# Patient Record
Sex: FEMALE | Race: BLACK OR AFRICAN AMERICAN | Employment: PART TIME | URBAN - METROPOLITAN AREA
[De-identification: names, ages, dates, MRNs, and addresses within clinical notes are randomized per-mention and may not be internally consistent; named-entity substitution may affect disease eponyms.]

---

## 2017-05-12 ENCOUNTER — HOSPITAL ENCOUNTER (OUTPATIENT)
Dept: GENERAL RADIOLOGY | Age: 22
Discharge: HOME | End: 2017-05-12
Payer: COMMERCIAL

## 2017-05-12 ENCOUNTER — HOSPITAL ENCOUNTER (OUTPATIENT)
Dept: LAB | Age: 22
Discharge: HOME | End: 2017-05-12
Payer: COMMERCIAL

## 2017-05-12 DIAGNOSIS — M25.512 LEFT SHOULDER PAIN: ICD-10-CM

## 2017-05-12 DIAGNOSIS — M54.2 CERVICALGIA: ICD-10-CM

## 2017-05-12 DIAGNOSIS — M54.50 LOW BACK PAIN: ICD-10-CM

## 2017-05-12 LAB
BASOPHILS # BLD AUTO: 0 K/UL (ref 0–0.06)
BASOPHILS # BLD: 0 % (ref 0–2)
DIFFERENTIAL METHOD BLD: ABNORMAL
EOSINOPHIL # BLD: 0.1 K/UL (ref 0–0.4)
EOSINOPHIL NFR BLD: 1 % (ref 0–5)
ERYTHROCYTE [DISTWIDTH] IN BLOOD BY AUTOMATED COUNT: 14.6 % (ref 11.6–14.5)
FERRITIN SERPL-MCNC: 29 NG/ML (ref 8–388)
HCT VFR BLD AUTO: 35.5 % (ref 35–45)
HGB BLD-MCNC: 11.3 G/DL (ref 12–16)
IRON SATN MFR SERPL: 26 %
IRON SERPL-MCNC: 90 UG/DL (ref 50–175)
LYMPHOCYTES # BLD AUTO: 35 % (ref 21–52)
LYMPHOCYTES # BLD: 2.6 K/UL (ref 0.9–3.6)
MCH RBC QN AUTO: 21.9 PG (ref 24–34)
MCHC RBC AUTO-ENTMCNC: 31.8 G/DL (ref 31–37)
MCV RBC AUTO: 68.9 FL (ref 74–97)
MONOCYTES # BLD: 0.6 K/UL (ref 0.05–1.2)
MONOCYTES NFR BLD AUTO: 8 % (ref 3–10)
NEUTS SEG # BLD: 4 K/UL (ref 1.8–8)
NEUTS SEG NFR BLD AUTO: 56 % (ref 40–73)
PLATELET # BLD AUTO: 305 K/UL (ref 135–420)
PMV BLD AUTO: 11.5 FL (ref 9.2–11.8)
RBC # BLD AUTO: 5.15 M/UL (ref 4.2–5.3)
TIBC SERPL-MCNC: 349 UG/DL (ref 250–450)
WBC # BLD AUTO: 7.2 K/UL (ref 4.6–13.2)

## 2017-05-12 PROCEDURE — 73030 X-RAY EXAM OF SHOULDER: CPT

## 2017-05-12 PROCEDURE — 36415 COLL VENOUS BLD VENIPUNCTURE: CPT | Performed by: INTERNAL MEDICINE

## 2017-05-12 PROCEDURE — 82728 ASSAY OF FERRITIN: CPT | Performed by: INTERNAL MEDICINE

## 2017-05-12 PROCEDURE — 72100 X-RAY EXAM L-S SPINE 2/3 VWS: CPT

## 2017-05-12 PROCEDURE — 83540 ASSAY OF IRON: CPT | Performed by: INTERNAL MEDICINE

## 2017-05-12 PROCEDURE — 72040 X-RAY EXAM NECK SPINE 2-3 VW: CPT

## 2017-05-12 PROCEDURE — 85025 COMPLETE CBC W/AUTO DIFF WBC: CPT | Performed by: INTERNAL MEDICINE

## 2023-10-03 SDOH — HEALTH STABILITY: PHYSICAL HEALTH: ON AVERAGE, HOW MANY DAYS PER WEEK DO YOU ENGAGE IN MODERATE TO STRENUOUS EXERCISE (LIKE A BRISK WALK)?: 0 DAYS

## 2023-10-03 ASSESSMENT — SOCIAL DETERMINANTS OF HEALTH (SDOH)
WITHIN THE LAST YEAR, HAVE YOU BEEN KICKED, HIT, SLAPPED, OR OTHERWISE PHYSICALLY HURT BY YOUR PARTNER OR EX-PARTNER?: NO
WITHIN THE LAST YEAR, HAVE YOU BEEN AFRAID OF YOUR PARTNER OR EX-PARTNER?: NO
WITHIN THE LAST YEAR, HAVE TO BEEN RAPED OR FORCED TO HAVE ANY KIND OF SEXUAL ACTIVITY BY YOUR PARTNER OR EX-PARTNER?: NO
WITHIN THE LAST YEAR, HAVE YOU BEEN HUMILIATED OR EMOTIONALLY ABUSED IN OTHER WAYS BY YOUR PARTNER OR EX-PARTNER?: YES

## 2023-10-04 ASSESSMENT — ENCOUNTER SYMPTOMS: SHORTNESS OF BREATH: 0

## 2023-10-06 ENCOUNTER — OFFICE VISIT (OUTPATIENT)
Facility: CLINIC | Age: 28
End: 2023-10-06
Payer: COMMERCIAL

## 2023-10-06 VITALS
DIASTOLIC BLOOD PRESSURE: 80 MMHG | WEIGHT: 268 LBS | HEIGHT: 71 IN | RESPIRATION RATE: 17 BRPM | HEART RATE: 90 BPM | OXYGEN SATURATION: 100 % | SYSTOLIC BLOOD PRESSURE: 124 MMHG | BODY MASS INDEX: 37.52 KG/M2 | TEMPERATURE: 97.8 F

## 2023-10-06 DIAGNOSIS — K59.9 BOWEL DYSFUNCTION: Primary | ICD-10-CM

## 2023-10-06 DIAGNOSIS — Z11.59 NEED FOR HEPATITIS C SCREENING TEST: ICD-10-CM

## 2023-10-06 DIAGNOSIS — Z76.89 ENCOUNTER TO ESTABLISH CARE: ICD-10-CM

## 2023-10-06 DIAGNOSIS — F33.1 MODERATE EPISODE OF RECURRENT MAJOR DEPRESSIVE DISORDER (HCC): ICD-10-CM

## 2023-10-06 DIAGNOSIS — F41.1 GAD (GENERALIZED ANXIETY DISORDER): ICD-10-CM

## 2023-10-06 DIAGNOSIS — Z11.4 SCREENING FOR HIV (HUMAN IMMUNODEFICIENCY VIRUS): ICD-10-CM

## 2023-10-06 DIAGNOSIS — Z13.220 SCREENING FOR LIPID DISORDERS: ICD-10-CM

## 2023-10-06 PROBLEM — K59.00 CONSTIPATION: Status: ACTIVE | Noted: 2023-10-06

## 2023-10-06 PROBLEM — G40.909 EPILEPTIC SEIZURE (HCC): Status: ACTIVE | Noted: 2023-10-06

## 2023-10-06 PROCEDURE — 99204 OFFICE O/P NEW MOD 45 MIN: CPT

## 2023-10-06 SDOH — ECONOMIC STABILITY: INCOME INSECURITY: HOW HARD IS IT FOR YOU TO PAY FOR THE VERY BASICS LIKE FOOD, HOUSING, MEDICAL CARE, AND HEATING?: VERY HARD

## 2023-10-06 SDOH — ECONOMIC STABILITY: HOUSING INSECURITY
IN THE LAST 12 MONTHS, WAS THERE A TIME WHEN YOU DID NOT HAVE A STEADY PLACE TO SLEEP OR SLEPT IN A SHELTER (INCLUDING NOW)?: NO

## 2023-10-06 SDOH — ECONOMIC STABILITY: FOOD INSECURITY: WITHIN THE PAST 12 MONTHS, THE FOOD YOU BOUGHT JUST DIDN'T LAST AND YOU DIDN'T HAVE MONEY TO GET MORE.: OFTEN TRUE

## 2023-10-06 SDOH — ECONOMIC STABILITY: FOOD INSECURITY: WITHIN THE PAST 12 MONTHS, YOU WORRIED THAT YOUR FOOD WOULD RUN OUT BEFORE YOU GOT MONEY TO BUY MORE.: OFTEN TRUE

## 2023-10-06 ASSESSMENT — PATIENT HEALTH QUESTIONNAIRE - PHQ9
10. IF YOU CHECKED OFF ANY PROBLEMS, HOW DIFFICULT HAVE THESE PROBLEMS MADE IT FOR YOU TO DO YOUR WORK, TAKE CARE OF THINGS AT HOME, OR GET ALONG WITH OTHER PEOPLE: 0
1. LITTLE INTEREST OR PLEASURE IN DOING THINGS: 1
7. TROUBLE CONCENTRATING ON THINGS, SUCH AS READING THE NEWSPAPER OR WATCHING TELEVISION: 1
3. TROUBLE FALLING OR STAYING ASLEEP: 2
SUM OF ALL RESPONSES TO PHQ QUESTIONS 1-9: 10
SUM OF ALL RESPONSES TO PHQ9 QUESTIONS 1 & 2: 2
SUM OF ALL RESPONSES TO PHQ QUESTIONS 1-9: 10
5. POOR APPETITE OR OVEREATING: 1
4. FEELING TIRED OR HAVING LITTLE ENERGY: 2
6. FEELING BAD ABOUT YOURSELF - OR THAT YOU ARE A FAILURE OR HAVE LET YOURSELF OR YOUR FAMILY DOWN: 2
2. FEELING DOWN, DEPRESSED OR HOPELESS: 1
8. MOVING OR SPEAKING SO SLOWLY THAT OTHER PEOPLE COULD HAVE NOTICED. OR THE OPPOSITE, BEING SO FIGETY OR RESTLESS THAT YOU HAVE BEEN MOVING AROUND A LOT MORE THAN USUAL: 0
SUM OF ALL RESPONSES TO PHQ QUESTIONS 1-9: 10
9. THOUGHTS THAT YOU WOULD BE BETTER OFF DEAD, OR OF HURTING YOURSELF: 0
SUM OF ALL RESPONSES TO PHQ QUESTIONS 1-9: 10

## 2023-10-06 ASSESSMENT — ANXIETY QUESTIONNAIRES
GAD7 TOTAL SCORE: 13
2. NOT BEING ABLE TO STOP OR CONTROL WORRYING: 3
1. FEELING NERVOUS, ANXIOUS, OR ON EDGE: 2
3. WORRYING TOO MUCH ABOUT DIFFERENT THINGS: 3
7. FEELING AFRAID AS IF SOMETHING AWFUL MIGHT HAPPEN: 0
4. TROUBLE RELAXING: 2
IF YOU CHECKED OFF ANY PROBLEMS ON THIS QUESTIONNAIRE, HOW DIFFICULT HAVE THESE PROBLEMS MADE IT FOR YOU TO DO YOUR WORK, TAKE CARE OF THINGS AT HOME, OR GET ALONG WITH OTHER PEOPLE: NOT DIFFICULT AT ALL
5. BEING SO RESTLESS THAT IT IS HARD TO SIT STILL: 2
6. BECOMING EASILY ANNOYED OR IRRITABLE: 1

## 2023-10-06 ASSESSMENT — ENCOUNTER SYMPTOMS
DIARRHEA: 1
CONSTIPATION: 1
ABDOMINAL PAIN: 1

## 2023-10-06 NOTE — PATIENT INSTRUCTIONS
Dr Inés Edward [address] [place] [address] [facility] [place] [address]  Phone [phone]  Fax [phone]

## 2023-10-06 NOTE — PROGRESS NOTES
Ashlyn Last presents today for   Chief Complaint   Patient presents with    [facility]    Knee Pain     Pt c/o hearing \"crackles and pops\" in knees. C/o having pain bilaterally, pain is consistent, pain is dull. States it feels tender. Onset 4 years. No swelling. No aggravating factors and relieved with massaging. No treatment. Anemia     States she is always cold and would like her ironed checked. Diarrhea     Pt states she having bm every 2 days. Pt c/o having admominal pain 1x every week. Onset located in lower pelvic area. Pain is sharp and lasts until pt uses restroom, when the pain come bm is watery. No aggravating factors. Only relieved with bm. Abdominal Pain       Is someone accompanying this pt? no    Is the patient using any DME equipment during [address]? no    Depression Screening:      10/6/2023    11:49 AM   PHQ-9 Questionaire   Little interest or pleasure in doing things 1   Feeling down, depressed, or hopeless 1   Trouble falling or staying asleep, or sleeping too much 2   Feeling tired or having little energy 2   Poor appetite or overeating 1   Feeling bad about yourself - or that you are a failure or have let yourself or your family down 2   Trouble concentrating on things, such as reading the newspaper or watching television 1   Moving or speaking so slowly that other people could have noticed.  Or the opposite - being so fidgety or restless that you have been moving around a lot more than usual 0   Thoughts that you would be better off dead, or of hurting yourself in some way 0   PHQ-9 Total Score 10   If you checked off any problems, how difficult have these problems made it for you to do your work, take care of things at home, or get along with other people? 0        TESHA 7-Anxiety       10/6/2023    11:50 AM   TESHA-7 SCREENING   Feeling nervous, anxious, or on edge More than half the days   Not being able to stop or control worrying Nearly every day   Worrying too much about
intermittently  Characteristics: bowel movement every 2 days, sometimes has extreme pain, sharp pains to abdomen, has always had pains with using the bathroom, overheating, intermittent diarrhea/constipation  Associated symptoms: abdominal pain, lasts until she uses the restroom  No blood in stool or urine  Aggravating factors: trying new foods, gets sick, and states it's not all the time, when she ate chery for the first time, she got sick, but was fine the second time, when she tried a new steak restaurant, when tries different foods, fast foods  Relieving factors: avoids new foods  Treatment: attempted to try vitamins, but forgets  Has issues with constipation as a child, unsure if she was evaluated by specialist    Anxiety  Patient is seen for anxiety  Associated symptoms:  see TESHA-7  Current treatment: none  Followed by psychiatry: none  Mother has extreme anxiety      10/6/2023    11:50 AM   TESHA-7 SCREENING   Feeling nervous, anxious, or on edge More than half the days   Not being able to stop or control worrying Nearly every day   Worrying too much about different things Nearly every day   Trouble relaxing More than half the days   Being so restless that it is hard to sit still More than half the days   Becoming easily annoyed or irritable Several days   Feeling afraid as if something awful might happen Not at all   TESHA-7 Total Score 13   How difficult have these problems made it for you to do your work, take care of things at home, or get along with other people? Not difficult at all     Depression  Patient is seen for followup of depression.    Ongoing symptoms include:  see PHQ-9  Current treatment: none  Followed by psychiatry: none  Plan for homicide or suicide: None      10/6/2023    11:49 AM   PHQ-9    Little interest or pleasure in doing things 1   Feeling down, depressed, or hopeless 1   Trouble falling or staying asleep, or sleeping too much 2   Feeling tired or having little energy 2   Poor appetite

## 2023-10-20 ENCOUNTER — HOSPITAL ENCOUNTER (OUTPATIENT)
Facility: HOSPITAL | Age: 28
End: 2023-10-20
Payer: COMMERCIAL

## 2023-10-20 DIAGNOSIS — K59.9 BOWEL DYSFUNCTION: ICD-10-CM

## 2023-10-20 DIAGNOSIS — F33.1 MODERATE EPISODE OF RECURRENT MAJOR DEPRESSIVE DISORDER (HCC): ICD-10-CM

## 2023-10-20 DIAGNOSIS — Z11.59 NEED FOR HEPATITIS C SCREENING TEST: ICD-10-CM

## 2023-10-20 DIAGNOSIS — Z11.4 SCREENING FOR HIV (HUMAN IMMUNODEFICIENCY VIRUS): ICD-10-CM

## 2023-10-20 DIAGNOSIS — Z13.220 SCREENING FOR LIPID DISORDERS: ICD-10-CM

## 2023-10-20 DIAGNOSIS — F41.1 GAD (GENERALIZED ANXIETY DISORDER): ICD-10-CM

## 2023-10-20 LAB
ALBUMIN SERPL-MCNC: 3.9 G/DL (ref 3.4–5)
ALBUMIN/GLOB SERPL: 1 (ref 0.8–1.7)
ALP SERPL-CCNC: 87 U/L (ref 45–117)
ALT SERPL-CCNC: 21 U/L (ref 13–56)
ANION GAP SERPL CALC-SCNC: 4 MMOL/L (ref 3–18)
AST SERPL-CCNC: 13 U/L (ref 10–38)
BASOPHILS # BLD: 0.1 K/UL (ref 0–0.1)
BASOPHILS NFR BLD: 1 % (ref 0–2)
BILIRUB SERPL-MCNC: 0.8 MG/DL (ref 0.2–1)
BUN SERPL-MCNC: 10 MG/DL (ref 7–18)
BUN/CREAT SERPL: 13 (ref 12–20)
CALCIUM SERPL-MCNC: 9.1 MG/DL (ref 8.5–10.1)
CHLORIDE SERPL-SCNC: 107 MMOL/L (ref 100–111)
CHOLEST SERPL-MCNC: 159 MG/DL
CO2 SERPL-SCNC: 27 MMOL/L (ref 21–32)
CREAT SERPL-MCNC: 0.76 MG/DL (ref 0.6–1.3)
DIFFERENTIAL METHOD BLD: ABNORMAL
EOSINOPHIL # BLD: 0.1 K/UL (ref 0–0.4)
EOSINOPHIL NFR BLD: 2 % (ref 0–5)
ERYTHROCYTE [DISTWIDTH] IN BLOOD BY AUTOMATED COUNT: 15.3 % (ref 11.6–14.5)
GLOBULIN SER CALC-MCNC: 3.8 G/DL (ref 2–4)
GLUCOSE SERPL-MCNC: 86 MG/DL (ref 74–99)
HCT VFR BLD AUTO: 41.5 % (ref 35–45)
HCV AB SER IA-ACNC: 0.08 INDEX
HCV AB SERPL QL IA: NEGATIVE
HDLC SERPL-MCNC: 58 MG/DL (ref 40–60)
HDLC SERPL: 2.7 (ref 0–5)
HEPATITIS C COMMENT: NORMAL
HGB BLD-MCNC: 12.4 G/DL (ref 12–16)
HIV 1+2 AB+HIV1 P24 AG SERPL QL IA: NONREACTIVE
HIV 1/2 RESULT COMMENT: NORMAL
IMM GRANULOCYTES # BLD AUTO: 0 K/UL (ref 0–0.04)
IMM GRANULOCYTES NFR BLD AUTO: 0 % (ref 0–0.5)
LDLC SERPL CALC-MCNC: 87.4 MG/DL (ref 0–100)
LIPID PANEL: NORMAL
LYMPHOCYTES # BLD: 2.3 K/UL (ref 0.9–3.6)
LYMPHOCYTES NFR BLD: 44 % (ref 21–52)
MCH RBC QN AUTO: 21.6 PG (ref 24–34)
MCHC RBC AUTO-ENTMCNC: 29.9 G/DL (ref 31–37)
MCV RBC AUTO: 72.4 FL (ref 78–100)
MONOCYTES # BLD: 0.3 K/UL (ref 0.05–1.2)
MONOCYTES NFR BLD: 7 % (ref 3–10)
NEUTS SEG # BLD: 2.4 K/UL (ref 1.8–8)
NEUTS SEG NFR BLD: 46 % (ref 40–73)
NRBC # BLD: 0 K/UL (ref 0–0.01)
NRBC BLD-RTO: 0 PER 100 WBC
PLATELET # BLD AUTO: 326 K/UL (ref 135–420)
PMV BLD AUTO: 11.5 FL (ref 9.2–11.8)
POTASSIUM SERPL-SCNC: 4.1 MMOL/L (ref 3.5–5.5)
PROT SERPL-MCNC: 7.7 G/DL (ref 6.4–8.2)
RBC # BLD AUTO: 5.73 M/UL (ref 4.2–5.3)
SODIUM SERPL-SCNC: 138 MMOL/L (ref 136–145)
TRIGL SERPL-MCNC: 68 MG/DL
TSH SERPL DL<=0.05 MIU/L-ACNC: 1.57 UIU/ML (ref 0.36–3.74)
VLDLC SERPL CALC-MCNC: 13.6 MG/DL
WBC # BLD AUTO: 5.3 K/UL (ref 4.6–13.2)

## 2023-10-20 PROCEDURE — 87389 HIV-1 AG W/HIV-1&-2 AB AG IA: CPT

## 2023-10-20 PROCEDURE — 80061 LIPID PANEL: CPT

## 2023-10-20 PROCEDURE — 86803 HEPATITIS C AB TEST: CPT

## 2023-10-20 PROCEDURE — 36415 COLL VENOUS BLD VENIPUNCTURE: CPT

## 2023-10-20 PROCEDURE — 80053 COMPREHEN METABOLIC PANEL: CPT

## 2023-10-20 PROCEDURE — 84443 ASSAY THYROID STIM HORMONE: CPT

## 2023-10-20 PROCEDURE — 85025 COMPLETE CBC W/AUTO DIFF WBC: CPT

## 2023-11-09 PROBLEM — Z00.00 ANNUAL PHYSICAL EXAM: Status: ACTIVE | Noted: 2023-11-09

## 2023-11-09 PROBLEM — Z00.00 ANNUAL PHYSICAL EXAM: Status: RESOLVED | Noted: 2023-11-09 | Resolved: 2023-11-09

## 2023-11-09 PROBLEM — E61.1 LOW IRON: Status: ACTIVE | Noted: 2023-11-09

## 2023-11-21 PROBLEM — Z00.00 ANNUAL PHYSICAL EXAM: Status: RESOLVED | Noted: 2023-11-09 | Resolved: 2023-11-21

## 2023-11-28 PROBLEM — Z00.00 ANNUAL PHYSICAL EXAM: Status: RESOLVED | Noted: 2023-11-09 | Resolved: 2023-11-28

## 2023-11-28 ASSESSMENT — ENCOUNTER SYMPTOMS
SHORTNESS OF BREATH: 0
VOMITING: 0
CHEST TIGHTNESS: 0
COUGH: 0
ABDOMINAL PAIN: 0
DIARRHEA: 0
CONSTIPATION: 0
NAUSEA: 0
BLOOD IN STOOL: 0

## 2023-11-28 NOTE — PROGRESS NOTES
Anahi Randall is a 29 y.o. female is seen on 12/1/2023 for Annual Exam, Low iron, Anxiety, Depression, and Discuss Labs (10/20/23)    Assessment & Plan:     1. Annual physical exam  Assessment & Plan:  Physical activity for a total of 150 minutes per week is recommended, drink plenty of water. Reduce CHO intake, such as white pastas, white rice, white breads. Avoid fried foods, and eat more green, leafy vegetables, whole grains, and lean proteins. Discussed recommended screenings and vaccines. 2. Low iron  Assessment & Plan:  History of anemia  Advised pt to increase PO intake of foods high in iron  Recheck CBC in 3 months  If no improvement, consider referral to hematology for management   Orders:  -     CBC with Auto Differential; Future  3. TESHA (generalized anxiety disorder)  Assessment & Plan:  Pt prefers to start counseling/therapy  Recommend management per psychiatry  Pt given contact information for psychiatry offices, and advised to call offices for management  4. Moderate episode of recurrent major depressive disorder [facility])  Assessment & Plan:  Pt prefers to start counseling/therapy  Recommend management per psychiatry  Pt given contact information for psychiatry offices, and advised to call offices for management  5. Encounter to discuss test results  6. Influenza vaccination declined     Follow-up and Dispositions    Return in about 3 months (around 3/1/2024) for Anemia, Anxiety, Depression, Lab results.        Subjective:     HPI    Social Hx:  Occupation- works at a dental office  Household- grandmother  ETOH use - yes  Caffeine use- yes, not every day, varies  Recreational drug use- marijuana  Tobacco use - none    Family Hx:  HTN-  Diabetes-  HLD-  MI-  Stroke-  Cancer-  Mental Health Disorder- Autism on father's side - paternal grandmother, and paternal grandmother's sister  Autoimmune Disorder    Preventive:  Used a [employer] stepper, and will start using it  Diet: is a picky eater, and does

## 2023-11-28 NOTE — ASSESSMENT & PLAN NOTE
Pt prefers to start counseling/therapy  Recommend management per psychiatry  Pt given contact information for psychiatry offices, and advised to call offices for management

## 2023-11-28 NOTE — ASSESSMENT & PLAN NOTE
History of anemia  Advised pt to increase PO intake of foods high in iron  Recheck CBC in 3 months  If no improvement, consider referral to hematology for management

## 2023-12-01 ENCOUNTER — OFFICE VISIT (OUTPATIENT)
Facility: CLINIC | Age: 28
End: 2023-12-01

## 2023-12-01 VITALS
DIASTOLIC BLOOD PRESSURE: 81 MMHG | BODY MASS INDEX: 38.64 KG/M2 | HEART RATE: 87 BPM | HEIGHT: 71 IN | OXYGEN SATURATION: 100 % | WEIGHT: 276 LBS | SYSTOLIC BLOOD PRESSURE: 122 MMHG | TEMPERATURE: 97.1 F

## 2023-12-01 DIAGNOSIS — Z00.00 ANNUAL PHYSICAL EXAM: Primary | ICD-10-CM

## 2023-12-01 DIAGNOSIS — E61.1 LOW IRON: ICD-10-CM

## 2023-12-01 DIAGNOSIS — F41.1 GAD (GENERALIZED ANXIETY DISORDER): ICD-10-CM

## 2023-12-01 DIAGNOSIS — Z71.2 ENCOUNTER TO DISCUSS TEST RESULTS: ICD-10-CM

## 2023-12-01 DIAGNOSIS — Z28.21 INFLUENZA VACCINATION DECLINED: ICD-10-CM

## 2023-12-01 DIAGNOSIS — F33.1 MODERATE EPISODE OF RECURRENT MAJOR DEPRESSIVE DISORDER (HCC): ICD-10-CM

## 2023-12-01 ASSESSMENT — COLUMBIA-SUICIDE SEVERITY RATING SCALE - C-SSRS
4. HAVE YOU HAD THESE THOUGHTS AND HAD SOME INTENTION OF ACTING ON THEM?: NO
7. DID THIS OCCUR IN THE LAST THREE MONTHS: NO
5. HAVE YOU STARTED TO WORK OUT OR WORKED OUT THE DETAILS OF HOW TO KILL YOURSELF? DO YOU INTEND TO CARRY OUT THIS PLAN?: NO
3. HAVE YOU BEEN THINKING ABOUT HOW YOU MIGHT KILL YOURSELF?: NO

## 2023-12-01 ASSESSMENT — PATIENT HEALTH QUESTIONNAIRE - PHQ9
10. IF YOU CHECKED OFF ANY PROBLEMS, HOW DIFFICULT HAVE THESE PROBLEMS MADE IT FOR YOU TO DO YOUR WORK, TAKE CARE OF THINGS AT HOME, OR GET ALONG WITH OTHER PEOPLE: 0
SUM OF ALL RESPONSES TO PHQ9 QUESTIONS 1 & 2: 0
SUM OF ALL RESPONSES TO PHQ QUESTIONS 1-9: 1
9. THOUGHTS THAT YOU WOULD BE BETTER OFF DEAD, OR OF HURTING YOURSELF: 0
SUM OF ALL RESPONSES TO PHQ QUESTIONS 1-9: 1
7. TROUBLE CONCENTRATING ON THINGS, SUCH AS READING THE NEWSPAPER OR WATCHING TELEVISION: 0
4. FEELING TIRED OR HAVING LITTLE ENERGY: 0
1. LITTLE INTEREST OR PLEASURE IN DOING THINGS: 0
6. FEELING BAD ABOUT YOURSELF - OR THAT YOU ARE A FAILURE OR HAVE LET YOURSELF OR YOUR FAMILY DOWN: 0
8. MOVING OR SPEAKING SO SLOWLY THAT OTHER PEOPLE COULD HAVE NOTICED. OR THE OPPOSITE, BEING SO FIGETY OR RESTLESS THAT YOU HAVE BEEN MOVING AROUND A LOT MORE THAN USUAL: 0
SUM OF ALL RESPONSES TO PHQ QUESTIONS 1-9: 1
5. POOR APPETITE OR OVEREATING: 1
3. TROUBLE FALLING OR STAYING ASLEEP: 0
SUM OF ALL RESPONSES TO PHQ QUESTIONS 1-9: 1
2. FEELING DOWN, DEPRESSED OR HOPELESS: 0

## 2023-12-01 ASSESSMENT — ANXIETY QUESTIONNAIRES
5. BEING SO RESTLESS THAT IT IS HARD TO SIT STILL: 0
IF YOU CHECKED OFF ANY PROBLEMS ON THIS QUESTIONNAIRE, HOW DIFFICULT HAVE THESE PROBLEMS MADE IT FOR YOU TO DO YOUR WORK, TAKE CARE OF THINGS AT HOME, OR GET ALONG WITH OTHER PEOPLE: NOT DIFFICULT AT ALL
3. WORRYING TOO MUCH ABOUT DIFFERENT THINGS: 2
GAD7 TOTAL SCORE: 3
6. BECOMING EASILY ANNOYED OR IRRITABLE: 0
7. FEELING AFRAID AS IF SOMETHING AWFUL MIGHT HAPPEN: 0
2. NOT BEING ABLE TO STOP OR CONTROL WORRYING: 0
4. TROUBLE RELAXING: 1
1. FEELING NERVOUS, ANXIOUS, OR ON EDGE: 0

## 2023-12-01 NOTE — PROGRESS NOTES
Juanito Purcell presents today for   Chief Complaint   Patient presents with    Annual Exam    Low iron    Anxiety    Depression    Discuss Labs     10/20/23       Is someone accompanying this pt? no    Is the patient using any DME equipment during [address]? no    Depression Screenin/1/2023     2:00 PM 10/6/2023    11:49 AM   PHQ-9 Questionaire   Little interest or pleasure in doing things 0 1   Feeling down, depressed, or hopeless 0 1   Trouble falling or staying asleep, or sleeping too much 0 2   Feeling tired or having little energy 0 2   Poor appetite or overeating 1 1   Feeling bad about yourself - or that you are a failure or have let yourself or your family down 0 2   Trouble concentrating on things, such as reading the newspaper or watching television 0 1   Moving or speaking so slowly that other people could have noticed.  Or the opposite - being so fidgety or restless that you have been moving around a lot more than usual 0 0   Thoughts that you would be better off dead, or of hurting yourself in some way 0 0   PHQ-9 Total Score 1 10   If you checked off any problems, how difficult have these problems made it for you to do your work, take care of things at home, or get along with other people? 0 0        TESHA 7-Anxiety       2023     2:00 PM 10/6/2023    11:50 AM   TESHA-7 SCREENING   Feeling nervous, anxious, or on edge Not at all More than half the days   Not being able to stop or control worrying Not at all Nearly every day   Worrying too much about different things More than half the days Nearly every day   Trouble relaxing Several days More than half the days   Being so restless that it is hard to sit still Not at all More than half the days   Becoming easily annoyed or irritable Not at all Several days   Feeling afraid as if something awful might happen Not at all Not at all   TESHA-7 Total Score 3 13   How difficult have these problems made it for you to do your work, take care of things at home,